# Patient Record
Sex: MALE | Race: WHITE | Employment: OTHER | URBAN - NONMETROPOLITAN AREA
[De-identification: names, ages, dates, MRNs, and addresses within clinical notes are randomized per-mention and may not be internally consistent; named-entity substitution may affect disease eponyms.]

---

## 2017-02-07 ENCOUNTER — OFFICE VISIT (OUTPATIENT)
Dept: PSYCHIATRY | Age: 42
End: 2017-02-07
Payer: COMMERCIAL

## 2017-02-07 VITALS
DIASTOLIC BLOOD PRESSURE: 76 MMHG | BODY MASS INDEX: 26.87 KG/M2 | WEIGHT: 187.7 LBS | HEIGHT: 70 IN | HEART RATE: 64 BPM | SYSTOLIC BLOOD PRESSURE: 117 MMHG | OXYGEN SATURATION: 100 %

## 2017-02-07 DIAGNOSIS — F31.0: Primary | ICD-10-CM

## 2017-02-07 PROCEDURE — 99214 OFFICE O/P EST MOD 30 MIN: CPT | Performed by: NURSE PRACTITIONER

## 2017-02-07 RX ORDER — ESCITALOPRAM OXALATE 10 MG/1
10 TABLET ORAL DAILY
Qty: 30 TABLET | Refills: 2 | Status: SHIPPED | OUTPATIENT
Start: 2017-02-07 | End: 2017-06-08 | Stop reason: SDUPTHER

## 2017-02-07 RX ORDER — CLONAZEPAM 0.5 MG/1
0.5 TABLET ORAL 2 TIMES DAILY PRN
Qty: 60 TABLET | Refills: 0 | Status: SHIPPED | OUTPATIENT
Start: 2017-02-07 | End: 2017-06-08 | Stop reason: SDUPTHER

## 2017-02-07 RX ORDER — RISPERIDONE 0.5 MG/1
0.5 TABLET, FILM COATED ORAL 2 TIMES DAILY
Qty: 60 TABLET | Refills: 2 | Status: SHIPPED | OUTPATIENT
Start: 2017-02-07 | End: 2017-06-08 | Stop reason: SDUPTHER

## 2017-06-08 ENCOUNTER — OFFICE VISIT (OUTPATIENT)
Dept: PSYCHIATRY | Age: 42
End: 2017-06-08
Payer: COMMERCIAL

## 2017-06-08 ENCOUNTER — TELEPHONE (OUTPATIENT)
Dept: PSYCHIATRY | Age: 42
End: 2017-06-08

## 2017-06-08 VITALS
OXYGEN SATURATION: 97 % | HEIGHT: 70 IN | SYSTOLIC BLOOD PRESSURE: 123 MMHG | HEART RATE: 87 BPM | DIASTOLIC BLOOD PRESSURE: 81 MMHG | WEIGHT: 186.6 LBS | BODY MASS INDEX: 26.71 KG/M2

## 2017-06-08 DIAGNOSIS — F31.0: Primary | ICD-10-CM

## 2017-06-08 PROCEDURE — 99213 OFFICE O/P EST LOW 20 MIN: CPT | Performed by: NURSE PRACTITIONER

## 2017-06-08 RX ORDER — RISPERIDONE 0.5 MG/1
0.5 TABLET, FILM COATED ORAL 2 TIMES DAILY
Qty: 60 TABLET | Refills: 2 | Status: SHIPPED | OUTPATIENT
Start: 2017-06-08 | End: 2017-09-12 | Stop reason: SDUPTHER

## 2017-06-08 RX ORDER — CLONAZEPAM 0.5 MG/1
0.5 TABLET ORAL 2 TIMES DAILY PRN
Qty: 60 TABLET | Refills: 0 | Status: SHIPPED | OUTPATIENT
Start: 2017-06-08 | End: 2017-09-12 | Stop reason: ALTCHOICE

## 2017-06-08 RX ORDER — ESCITALOPRAM OXALATE 10 MG/1
10 TABLET ORAL DAILY
Qty: 30 TABLET | Refills: 2 | Status: SHIPPED | OUTPATIENT
Start: 2017-06-08 | End: 2017-09-12 | Stop reason: SDUPTHER

## 2017-09-12 ENCOUNTER — OFFICE VISIT (OUTPATIENT)
Dept: PSYCHIATRY | Age: 42
End: 2017-09-12
Payer: COMMERCIAL

## 2017-09-12 VITALS
SYSTOLIC BLOOD PRESSURE: 123 MMHG | DIASTOLIC BLOOD PRESSURE: 73 MMHG | BODY MASS INDEX: 26.63 KG/M2 | OXYGEN SATURATION: 100 % | HEIGHT: 70 IN | HEART RATE: 66 BPM | WEIGHT: 186 LBS

## 2017-09-12 DIAGNOSIS — F31.0: ICD-10-CM

## 2017-09-12 PROCEDURE — 99213 OFFICE O/P EST LOW 20 MIN: CPT | Performed by: NURSE PRACTITIONER

## 2017-09-12 PROCEDURE — 90832 PSYTX W PT 30 MINUTES: CPT | Performed by: NURSE PRACTITIONER

## 2017-09-12 RX ORDER — HYDROXYZINE PAMOATE 50 MG/1
CAPSULE ORAL
Qty: 60 CAPSULE | Refills: 2 | Status: SHIPPED | OUTPATIENT
Start: 2017-09-12 | End: 2017-12-12

## 2017-09-12 RX ORDER — ESCITALOPRAM OXALATE 10 MG/1
10 TABLET ORAL DAILY
Qty: 30 TABLET | Refills: 2 | Status: SHIPPED | OUTPATIENT
Start: 2017-09-12 | End: 2017-12-12 | Stop reason: DRUGHIGH

## 2017-09-12 RX ORDER — RISPERIDONE 0.5 MG/1
0.5 TABLET, FILM COATED ORAL 2 TIMES DAILY
Qty: 60 TABLET | Refills: 2 | Status: SHIPPED | OUTPATIENT
Start: 2017-09-12 | End: 2017-12-12 | Stop reason: DRUGHIGH

## 2017-12-12 ENCOUNTER — OFFICE VISIT (OUTPATIENT)
Dept: PSYCHIATRY | Age: 42
End: 2017-12-12
Payer: COMMERCIAL

## 2017-12-12 VITALS
OXYGEN SATURATION: 100 % | HEIGHT: 70 IN | HEART RATE: 62 BPM | BODY MASS INDEX: 26.9 KG/M2 | DIASTOLIC BLOOD PRESSURE: 76 MMHG | WEIGHT: 187.9 LBS | SYSTOLIC BLOOD PRESSURE: 123 MMHG

## 2017-12-12 DIAGNOSIS — F31.9 BIPOLAR I DISORDER (HCC): Primary | ICD-10-CM

## 2017-12-12 PROCEDURE — 90832 PSYTX W PT 30 MINUTES: CPT | Performed by: NURSE PRACTITIONER

## 2017-12-12 PROCEDURE — 99999 PR OFFICE/OUTPT VISIT,PROCEDURE ONLY: CPT | Performed by: NURSE PRACTITIONER

## 2017-12-12 RX ORDER — RISPERIDONE 0.5 MG/1
TABLET, FILM COATED ORAL
Qty: 15 TABLET | Refills: 3 | Status: SHIPPED | OUTPATIENT
Start: 2017-12-12 | End: 2017-12-12 | Stop reason: DRUGHIGH

## 2017-12-12 RX ORDER — ESCITALOPRAM OXALATE 5 MG/1
5 TABLET ORAL DAILY
Qty: 30 TABLET | Refills: 3 | Status: SHIPPED | OUTPATIENT
Start: 2017-12-12 | End: 2018-03-13 | Stop reason: SDUPTHER

## 2017-12-12 RX ORDER — RISPERIDONE 0.5 MG/1
TABLET, FILM COATED ORAL
Qty: 30 TABLET | Refills: 1 | Status: SHIPPED | OUTPATIENT
Start: 2017-12-12 | End: 2018-03-13 | Stop reason: SDUPTHER

## 2017-12-12 NOTE — PROGRESS NOTES
12/12/2017 10:02 AM   Progress Note        Benay Mortimer 1975  Psychotherapy Time Spent: 17 min      Psychotherapy Topics: family, medication changes, back pain    Subjective: The patient is a 42 y.o.   male who is here for a routine office visit. Last seen by this writer on  6/8/17. Dx: Bipolar I D/O    Continues to care take mom with dementia. Going well. She is in the lobby. Depression: Denies  Anxiety: Denies  Sleep: Good  Appetite: Good  Substance Use: Denies  Pain: Denies    Six to seven months ago he decreased his Risperdal dose to one qhs instead of one bid because he was feeling blunted. Also cut Lexapro in half at same time for same reason. Feeling better and would like to continue dose changes. He states when he began med management one of his sources of depression and anxiety was his back pain which kept him from doing the things he wanted to do. His back pain is greatly improved and he is able to function better. This is another reason he decreased his doses. Pt has multiple hobbies. He will call if needed for dose adjustments. Patient reports side effects as follows: none. No evidence of EPS, no cogwheeling or abnormal motor movements. Absent  suicidal ideation. Reports compliance with medications as see above. Review of Systems - Denies changes except for decrease in back pain. Current Meds:    Prior to Admission medications    Medication Sig Start Date End Date Taking? Authorizing Provider   risperiDONE (RISPERDAL) 0.5 MG tablet Take 1 tablet by mouth 2 times daily 9/12/17   Jari Pallas, APRN   escitalopram (LEXAPRO) 10 MG tablet Take 1 tablet by mouth daily 9/12/17   Jari Pallas, APRN   hydrOXYzine (VISTARIL) 50 MG capsule Take one to two tablets qhs prn anxiety 9/12/17   Jari Pallas, APRN       MSE:  Patient is  A & O x3. Appearance:  well-appearing appropriately dressed for season and age.   Cognition:  Recent memory intact , remote memory intact , good fund of knowledge, average  intelligence level. Speech:  normal  Language: Naming: intact; Word Finding: intact  Conversation no evidence of delusions, paranoid, persecutory, grandiose, ideas of reference, thought broadcasting, thought insertion and delusions of control  Behavior:  Cooperative and Good eye contact  Mood: happy  Affect: congruent with mood  Thought Content: no evidence of overt psychosis, delusional thought or suicidal /homicidal ideation or plan  Thought Process: linear, goal directed and coherent  Judgement Insight:  normal and appropriate  Gait and Station:normal gait and station   Musculoskeletal: WNL    Assesment: See above   Ceci Simms report reviewed per  req. Plan: Decrease Risperdal to 0.5 mg qhs, decrease Lexapro to 5 mg daily. 1. The risks, benefits, side effects, indications, contraindications, and adverse effects of the medications have been discussed. yes   2. The pt has verbalized understanding and has capacity to give informed consent. 3. The Ceci Simms report has been reviewed according to [facility] regulations. 4. Supportive therapy offered. 5. Follow up: Return in 3 months  6. The patient has been advised to call with any problems.   7. Controlled substance Treatment Plan: No Rx

## 2018-03-13 ENCOUNTER — OFFICE VISIT (OUTPATIENT)
Dept: PSYCHIATRY | Age: 43
End: 2018-03-13
Payer: COMMERCIAL

## 2018-03-13 VITALS
SYSTOLIC BLOOD PRESSURE: 130 MMHG | DIASTOLIC BLOOD PRESSURE: 73 MMHG | OXYGEN SATURATION: 100 % | HEIGHT: 70 IN | HEART RATE: 78 BPM | BODY MASS INDEX: 26.2 KG/M2 | WEIGHT: 183 LBS

## 2018-03-13 DIAGNOSIS — F31.9 BIPOLAR I DISORDER (HCC): Primary | ICD-10-CM

## 2018-03-13 PROCEDURE — 99999 PR OFFICE/OUTPT VISIT,PROCEDURE ONLY: CPT | Performed by: NURSE PRACTITIONER

## 2018-03-13 PROCEDURE — 90832 PSYTX W PT 30 MINUTES: CPT | Performed by: NURSE PRACTITIONER

## 2018-03-13 RX ORDER — ESCITALOPRAM OXALATE 5 MG/1
5 TABLET ORAL DAILY
Qty: 30 TABLET | Refills: 3 | Status: SHIPPED | OUTPATIENT
Start: 2018-03-13 | End: 2018-07-10 | Stop reason: SDUPTHER

## 2018-03-13 RX ORDER — RISPERIDONE 0.5 MG/1
TABLET, FILM COATED ORAL
Qty: 30 TABLET | Refills: 3 | Status: SHIPPED | OUTPATIENT
Start: 2018-03-13 | End: 2018-10-04

## 2018-03-13 NOTE — PROGRESS NOTES
evidence of overt psychosis, delusional thought or suicidal /homicidal ideation or plan  Thought Process: linear, goal directed and coherent  Judgement Insight:  normal and appropriate  Gait and Station:normal gait and station   Musculoskeletal: WNL    Assesment: See above   Lora Wheeler report reviewed per  req. Plan: Continue Lexapro 5 mg, Risperdal 0.5 mg qhs  1. The risks, benefits, side effects, indications, contraindications, and adverse effects of the medications have been discussed. yes   2. The pt has verbalized understanding and has capacity to give informed consent. 3. The Lora Wheeler report has been reviewed according to [facility] regulations. 4. Supportive therapy offered. 5. Follow up: Return in about 4 months (around 7/13/2018). 6. The patient has been advised to call with any problems.   7. Controlled substance Treatment Plan: No Rx

## 2018-07-10 ENCOUNTER — OFFICE VISIT (OUTPATIENT)
Dept: PSYCHIATRY | Age: 43
End: 2018-07-10
Payer: COMMERCIAL

## 2018-07-10 VITALS
HEIGHT: 70 IN | WEIGHT: 189.3 LBS | OXYGEN SATURATION: 97 % | HEART RATE: 86 BPM | BODY MASS INDEX: 27.1 KG/M2 | DIASTOLIC BLOOD PRESSURE: 89 MMHG | SYSTOLIC BLOOD PRESSURE: 136 MMHG

## 2018-07-10 DIAGNOSIS — F31.9 BIPOLAR I DISORDER (HCC): Primary | ICD-10-CM

## 2018-07-10 PROCEDURE — 99213 OFFICE O/P EST LOW 20 MIN: CPT | Performed by: NURSE PRACTITIONER

## 2018-07-10 RX ORDER — ESCITALOPRAM OXALATE 5 MG/1
5 TABLET ORAL DAILY
Qty: 30 TABLET | Refills: 3 | Status: SHIPPED | OUTPATIENT
Start: 2018-07-10 | End: 2018-10-04 | Stop reason: SDUPTHER

## 2018-07-10 NOTE — PROGRESS NOTES
7/10/2018      Progress Note        Nina Vo 1975       HPI:  The patient is a 36 y.o.   male who is here for a routine office visit. Dx: Bipolar I D/O, current of most recent episode hypomanic with anxious distress    Doing well overall. Continues to take care of his mother who has Alzheimer's  which is going well. He states he stopped Risperdal at the end of March. He did not feel he needed it. He denies intrusive thoughts, AH/VH, paranoia, SI/HI. He has a full bottle at home and states he will start it again if he feels he needs it. Encouraged pt to call office with questions or problems and to go the ER to be evaluated if necessary. He agrees. Patient reports side effects as follows: none. No evidence of EPS, no cogwheeling or abnormal motor movements. Absent  suicidal ideation. Reports compliance with medications as see above     Review of Systems - Denies change      Current Meds:    Current Outpatient Prescriptions   Medication Sig Dispense Refill    escitalopram (LEXAPRO) 5 MG tablet Take 1 tablet by mouth daily 30 tablet 3    risperiDONE (RISPERDAL) 0.5 MG tablet Take one tablet qhs 30 tablet 3     No current facility-administered medications for this visit. MSE:  Patient is  A & O x3. Appearance:  well-appearing appropriately dressed for season and age. Cognition:  Recent memory intact , remote memory intact , good fund of knowledge, average  intelligence level. Speech:  normal  Language: Naming: intact; Word Finding: intact  Conversation no evidence of delusions  Behavior:  Cooperative and Good eye contact  Mood: happy  Affect: congruent with mood  Thought Content: no evidence of overt psychosis, delusional thought or suicidal /homicidal ideation or plan  Thought Process: linear, goal directed and coherent  Judgement Insight:   Fair  Gait and Station:normal gait and station   Musculoskeletal: WNL        Plan: Continue Lexapro 5 mg qam  1.  The risks, benefits,

## 2018-10-04 ENCOUNTER — OFFICE VISIT (OUTPATIENT)
Dept: PSYCHIATRY | Age: 43
End: 2018-10-04
Payer: COMMERCIAL

## 2018-10-04 DIAGNOSIS — F41.9 ANXIETY: Primary | ICD-10-CM

## 2018-10-04 PROCEDURE — 99214 OFFICE O/P EST MOD 30 MIN: CPT | Performed by: CLINICAL NURSE SPECIALIST

## 2018-10-04 RX ORDER — ESCITALOPRAM OXALATE 5 MG/1
5 TABLET ORAL DAILY
Qty: 30 TABLET | Refills: 5 | Status: SHIPPED | OUTPATIENT
Start: 2018-10-04 | End: 2019-03-07 | Stop reason: SDUPTHER

## 2018-10-04 NOTE — PROGRESS NOTES
10/4/2018 3:34 PM   Progress Note        Cheryl Godoy 1975  Psychotherapy Time Spent: 25 min      Psychotherapy Topics: family and health    PCP IS:Chuckie Gallegos APRN, SRI        Subjective:  Patient is a 35 yo  male diagnosed with Bipolar disorder and presents today for follow-up. Last seen in clinic on 7/10 and prior records were reviewed. History obtained via chart review and patient    CHIEF COMPLAINT: anxiety    Today patient states, he is doing well. He stopped taking Risperidone several months ago, didn't feel he needed it. No difference. He reports having anxiety for a long time, since a teenager, but is not as troublesome now. He hasnt had a big anxiety attack in a long time. He is sole caregiver for his dementing mother, who has been diagnosed with Alzheimers disease and medical problems. He takes his mother with him when he goes places. He has to help her with mobility, medications,  hygiene, takes care of the house. 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 The responsibilities are all on him. SUBSTANCE USE/ABUSE:   TOBACCO: denied   ALCOHOL: historic. Last time was a couple of weeks ago, 1 drink then. Once a week or less, doesn't feel like it. MARIJUANA: denied   STREET/RECREATIONAL DRUGS: denied    DANGEROUSNESS:   SUICIDE IDEATION: denied   HOMICIDAL IDEATION/DANGEROUSNESS TO OTHERS: denied        Review of Systems - 12 point review negative today        Current Meds:    Prior to Admission medications    Medication Sig Start Date End Date Taking? Authorizing Provider   escitalopram (LEXAPRO) 5 MG tablet Take 1 tablet by mouth daily 7/10/18   JOSE DAVID Eng       Reports compliance with medications as good . Patient reports side effects as follows: none. No evidence of EPS, no cogwheeling or abnormal motor movements. Gait and Station:normal gait and station   Musculoskeletal: WNL    MSE:  Patient is  A & O x3.   Appearance:

## 2019-03-07 ENCOUNTER — OFFICE VISIT (OUTPATIENT)
Dept: PSYCHIATRY | Age: 44
End: 2019-03-07
Payer: COMMERCIAL

## 2019-03-07 VITALS — HEART RATE: 92 BPM | OXYGEN SATURATION: 98 % | DIASTOLIC BLOOD PRESSURE: 86 MMHG | SYSTOLIC BLOOD PRESSURE: 146 MMHG

## 2019-03-07 DIAGNOSIS — F41.9 ANXIETY: Primary | ICD-10-CM

## 2019-03-07 PROCEDURE — 99213 OFFICE O/P EST LOW 20 MIN: CPT | Performed by: NURSE PRACTITIONER

## 2019-03-07 RX ORDER — ESCITALOPRAM OXALATE 5 MG/1
5 TABLET ORAL DAILY
Qty: 30 TABLET | Refills: 5 | Status: SHIPPED | OUTPATIENT
Start: 2019-03-07 | End: 2019-09-03 | Stop reason: SDUPTHER

## 2019-09-03 ENCOUNTER — OFFICE VISIT (OUTPATIENT)
Dept: PSYCHIATRY | Age: 44
End: 2019-09-03
Payer: COMMERCIAL

## 2019-09-03 VITALS
SYSTOLIC BLOOD PRESSURE: 134 MMHG | DIASTOLIC BLOOD PRESSURE: 84 MMHG | OXYGEN SATURATION: 96 % | TEMPERATURE: 97.4 F | BODY MASS INDEX: 30.42 KG/M2 | WEIGHT: 212 LBS

## 2019-09-03 DIAGNOSIS — F41.9 ANXIETY: Primary | ICD-10-CM

## 2019-09-03 PROCEDURE — 99214 OFFICE O/P EST MOD 30 MIN: CPT | Performed by: NURSE PRACTITIONER

## 2019-09-03 RX ORDER — ESCITALOPRAM OXALATE 5 MG/1
5 TABLET ORAL DAILY
Qty: 30 TABLET | Refills: 5 | Status: SHIPPED | OUTPATIENT
Start: 2019-09-03 | End: 2020-05-01 | Stop reason: SDUPTHER

## 2019-09-03 NOTE — PROGRESS NOTES
9/3/2019 1:54 PM   Progress Note    IN:  1330  OUT: [address] [facility] 1975      Chief Complaint   Patient presents with    6 Month Follow-Up    Anxiety         Subjective:  Patient is a 39 y.o. male diagnosed with Anxiety and presents today for follow-up. Last seen in clinic on 3/7/19 and prior records were reviewed. Today patient states, \"I'm doing good. \" He reports that he continues to care for his mother who has Alzheimer's Disease. He reports that he traveled with her this summer to the [facility] and that he took her to the [facility] on the way back. He has plans to visit the ocean with her this fall. He says that it is quite a workout to travel with her. He has a rental house and farms for income. Patient reports side effects as follows: none. No evidence of EPS, no cogwheeling or abnormal motor movements. Absent  suicidal ideation. Reports compliance with medications as good .      Current Substance Use:  See history    BP: /84 (Site: Left Upper Arm, Position: Sitting)   Temp 97.4 °F (36.3 °C)   Wt 212 lb (96.2 kg)   SpO2 96%   BMI 30.42 kg/m²       Review of Systems - 14 point review:  Negative except being treated for: anxiety    Constitutional: (fevers, chills, night sweats, wt loss/gain, change in appetite, fatigue, somnolence)    HEENT: (ear pain or discharge, hearing loss, ear ringing, sinus pressure, nosebleed, nasal discharge, sore throat, oral sores, tooth pain, bleeding gums, hoarse voice, neck pain)      Cardiovascular: (HTN, chest pain, elevated cholesterol/lipids, palpitations, leg swelling, leg pain with walking)    Respiratory: (cough, wheezing, snoring, SOB with activity (dyspnea), SOB while lying flat (orthopnea), awakening with severe SOB (paroxysmal nocturnal dyspnea))    Gastrointestinal: (NVD, constipation, abdominal pain, bright red stools, black tarry stools, stool incontinence)     Genitourinary:  (pelvic pain, burning or frequency of urination,

## 2020-05-01 ENCOUNTER — VIRTUAL VISIT (OUTPATIENT)
Dept: PSYCHIATRY | Age: 45
End: 2020-05-01
Payer: COMMERCIAL

## 2020-05-01 PROCEDURE — 99443 PR PHYS/QHP TELEPHONE EVALUATION 21-30 MIN: CPT | Performed by: NURSE PRACTITIONER

## 2020-05-01 RX ORDER — ESCITALOPRAM OXALATE 5 MG/1
5 TABLET ORAL DAILY
Qty: 30 TABLET | Refills: 5 | Status: SHIPPED | OUTPATIENT
Start: 2020-05-01 | End: 2021-01-25 | Stop reason: ALTCHOICE

## 2020-05-01 SDOH — HEALTH STABILITY: MENTAL HEALTH: HOW MANY STANDARD DRINKS CONTAINING ALCOHOL DO YOU HAVE ON A TYPICAL DAY?: 1 OR 2

## 2021-01-25 ENCOUNTER — VIRTUAL VISIT (OUTPATIENT)
Dept: PSYCHIATRY | Age: 46
End: 2021-01-25
Payer: COMMERCIAL

## 2021-01-25 DIAGNOSIS — F33.42 RECURRENT MAJOR DEPRESSIVE DISORDER, IN FULL REMISSION (HCC): Primary | ICD-10-CM

## 2021-01-25 PROCEDURE — 99441 PR PHYS/QHP TELEPHONE EVALUATION 5-10 MIN: CPT | Performed by: NURSE PRACTITIONER

## 2021-01-25 SDOH — HEALTH STABILITY: MENTAL HEALTH: HOW OFTEN DO YOU HAVE A DRINK CONTAINING ALCOHOL?: 4 OR MORE TIMES A WEEK

## 2021-01-25 ASSESSMENT — PATIENT HEALTH QUESTIONNAIRE - PHQ9
SUM OF ALL RESPONSES TO PHQ QUESTIONS 1-9: 0
6. FEELING BAD ABOUT YOURSELF - OR THAT YOU ARE A FAILURE OR HAVE LET YOURSELF OR YOUR FAMILY DOWN: 0
7. TROUBLE CONCENTRATING ON THINGS, SUCH AS READING THE NEWSPAPER OR WATCHING TELEVISION: 0
SUM OF ALL RESPONSES TO PHQ9 QUESTIONS 1 & 2: 0
9. THOUGHTS THAT YOU WOULD BE BETTER OFF DEAD, OR OF HURTING YOURSELF: 0
2. FEELING DOWN, DEPRESSED OR HOPELESS: 0
4. FEELING TIRED OR HAVING LITTLE ENERGY: 0
8. MOVING OR SPEAKING SO SLOWLY THAT OTHER PEOPLE COULD HAVE NOTICED. OR THE OPPOSITE, BEING SO FIGETY OR RESTLESS THAT YOU HAVE BEEN MOVING AROUND A LOT MORE THAN USUAL: 0
SUM OF ALL RESPONSES TO PHQ QUESTIONS 1-9: 0
1. LITTLE INTEREST OR PLEASURE IN DOING THINGS: 0

## 2021-01-25 ASSESSMENT — ANXIETY QUESTIONNAIRES
7. FEELING AFRAID AS IF SOMETHING AWFUL MIGHT HAPPEN: 0-NOT AT ALL
6. BECOMING EASILY ANNOYED OR IRRITABLE: 0-NOT AT ALL
3. WORRYING TOO MUCH ABOUT DIFFERENT THINGS: 0-NOT AT ALL
4. TROUBLE RELAXING: 0-NOT AT ALL
GAD7 TOTAL SCORE: 0

## 2021-01-25 NOTE — PROGRESS NOTES
Mary Pierce is a 54 y.o. male evaluated via telephone on 1/25/2021. Consent:  He and/or health care decision maker is aware that that he may receive a bill for this telephone service, depending on his insurance coverage, and has provided verbal consent to proceed: Yes      Documentation:  I communicated with the patient and/or health care decision maker about depression, anxiety. Details of this discussion including any medical advice provided: see below      I affirm this is a Patient Initiated Episode with a Patient who has not had a related appointment within my department in the past 7 days or scheduled within the next 24 hours. Patient identification was verified at the start of the visit: Yes    Total Time: minutes: 5-10 minutes    Note: not billable if this call serves to triage the patient into an appointment for the relevant concern      Richard Curiel     1/25/2021 11:50 AM   Progress Note    IN:  [address]  OUT: [address] 1975      Chief Complaint   Patient presents with    Follow-up    Anxiety    Depression         Subjective:  Patient is a 54 y.o. male diagnosed with Anxiety and presents today for follow-up. Last seen in clinic on 5/1/20 and prior records were reviewed. Today patient states, \"I'm doing good. \" He reports no complaints or concerns. He reports that he quit taking Lexapro back in July. Patient reports side effects as follows: none. No evidence of EPS, no cogwheeling or abnormal motor movements. Absent  suicidal ideation. Reports compliance with medications as good . Current Substance Use:  See history    BP: There were no vitals taken for this visit.       Review of Systems - 14 point review:  Negative except being treated for: is taking Metformin for pre-diabetes    Constitutional: (fevers, chills, night sweats, wt loss/gain, change in appetite, fatigue, somnolence) HEENT: (ear pain or discharge, hearing loss, ear ringing, sinus pressure, nosebleed, nasal discharge, sore throat, oral sores, tooth pain, bleeding gums, hoarse voice, neck pain)      Cardiovascular: (HTN, chest pain, elevated cholesterol/lipids, palpitations, leg swelling, leg pain with walking)    Respiratory: (cough, wheezing, snoring, SOB with activity (dyspnea), SOB while lying flat (orthopnea), awakening with severe SOB (paroxysmal nocturnal dyspnea))    Gastrointestinal: (NVD, constipation, abdominal pain, bright red stools, black tarry stools, stool incontinence)     Genitourinary:  (pelvic pain, burning or frequency of urination, urinary urgency, blood in urine incomplete bladder emptying, urinary incontinence, STD; MEN: testicular pain or swelling, erectile dysfunction; WOMEN: LMP, heavy menstrual bleeding (menorrhagia), irregular periods, postmenopausal bleeding, menstrual pain (dymenorrhea, vaginal discharge)    Musculoskeletal: (bone pain/fracture, joint pain or swelling, musle pain)    Integumentary: (rashes, acne, non-healing sores, itching, breast lumps, breast pain, nipple discharge, hair loss)    Neurologic: (HA, muscle weakness, paresthesias (numbness, coldness, crawling or prickling), memory loss, seizure, dizziness)    Psychiatric:  (anxiety, sadness, irritability/anger, insomnia, suicidality)    Endocrine: (heat or cold intolerance, excessive thirst (polydipsia), excessive hunger (polyphagia))    Immune/Allergic: (hives, seasonal or environmental allergies, HIV exposure)    Hematologic/Lymphatic: (lymph node enlargement, easy bleeding or bruising)    History obtained via chart review and patient    PCP is JOSE DAVID Smith CNP       Current Meds:    Prior to Admission medications    Medication Sig Start Date End Date Taking?  Authorizing Provider   metFORMIN (GLUCOPHAGE) 500 MG tablet Take 500 mg by mouth daily    Historical Provider, MD Multiple Vitamins-Minerals (MULTIVITAMIN ADULT PO) Take by mouth    Historical Provider, MD     Social History     Socioeconomic History    Marital status: Single     Spouse name: None    Number of children: 0    Years of education: 15    Highest education level: None   Occupational History    None   Social Needs    Financial resource strain: None    Food insecurity     Worry: None     Inability: None    Transportation needs     Medical: None     Non-medical: None   Tobacco Use    Smoking status: Never Smoker    Smokeless tobacco: Never Used   Substance and Sexual Activity    Alcohol use: Yes     Alcohol/week: 2.0 standard drinks     Types: 2 Shots of liquor per week     Frequency: 4 or more times a week     Drinks per session: 1 or 2     Comment: occasional, 2 shots, 4-5 times per week    Drug use: No    Sexual activity: Never     Comment: no current girlfriend   Lifestyle    Physical activity     Days per week: None     Minutes per session: None    Stress: None   Relationships    Social connections     Talks on phone: None     Gets together: None     Attends [employer] service: None     Active member of club or organization: None     Attends meetings of clubs or organizations: None     Relationship status: None    Intimate partner violence     Fear of current or ex partner: None     Emotionally abused: None     Physically abused: None     Forced sexual activity: None   Other Topics Concern    None   Social History Narrative    None       MSE:  Patient is  A & O x 4. Appearance:  FRENCH  Cognition:  Recent memory intact , remote memory intact , good fund of knowledge, average  intelligence level. Speech:  normal  Language: Naming: intact;  Word Finding: intact  Conversation no evidence of delusions  Behavior:  Cooperative  Mood: \"good\"   Affect: congruent with mood  Thought Content: negative delusions, negative hallucinations, negative obsessions,  negativehomicidal and negative suicidal Thought Process: linear, goal directed and coherent  Associations: logical connections  Attention Span and concentration: Normal   Judgement Insight:  normal and appropriate  Gait and Station: FRENCH  Sleep: avg 6-8 hrs   Appetite: ok    Cognition: Can spell \"world\" backwards: Yes                    Can do serial 7's: Yes    Lab Results   Component Value Date     01/27/2016    K 3.9 01/27/2016    CL 94 (L) 01/27/2016    CO2 30 (H) 01/27/2016    BUN 10 01/27/2016    CREATININE 0.6 01/27/2016    GLUCOSE 112 (H) 01/27/2016    CALCIUM 9.6 01/27/2016    PROT 7.6 01/27/2016    LABALBU 4.6 01/27/2016    BILITOT 0.8 01/27/2016    ALKPHOS 71 01/27/2016    AST 19 01/27/2016    ALT 31 01/27/2016    LABGLOM >60 01/27/2016    GLOB 3.0 01/27/2016     Lab Results   Component Value Date     01/27/2016    K 3.9 01/27/2016    CL 94 01/27/2016    CO2 30 01/27/2016    BUN 10 01/27/2016    CREATININE 0.6 01/27/2016    GLUCOSE 112 01/27/2016    CALCIUM 9.6 01/27/2016      Lab Results   Component Value Date    CHOL 224 (H) 02/29/2016     Lab Results   Component Value Date    TRIG 218 (H) 02/29/2016     Lab Results   Component Value Date    HDL 57 02/29/2016     Lab Results   Component Value Date    LDLCALC 123 02/29/2016     No results found for: LABVLDL, VLDL  No results found for: CHOLHDLRATIO  No results found for: LABA1C  No results found for: EAG  No results found for: TSHFT4, TSH  No results found for: VITD25    Assessments Administered:    PHQ9:  0, normal  GAD7:   0, normal    Assessment:   1. Recurrent major depressive disorder, in full remission ([facility] [address]        Plan:  Discontinue: Lexapro    Continue seeing Dajuan Reina every two month for therapy    Follow up: No follow-ups on file. 1. The risks, benefits, side effects, indications, contraindications, and adverse effects of the medications have been discussed. Yes.  2. The pt has verbalized understanding and has capacity to give informed consent. 3. The Nuris Shaikh report has been reviewed according to [facility] regulations. 4. Supportive therapy offered. 5. Follow up: No follow-ups on file. 6. The patient has been advised to call with any problems. 7. Controlled substance Treatment Plan: none. 8. The above listed medications have been continued, modifications in meds and other orders/labs as follows: No orders of the defined types were placed in this encounter. No orders of the defined types were placed in this encounter. 9. Additional comments: He reports that he stopped taking Lexapro in July, 2020 and that he is doing fine. This is reflected in his PHQ9 and GAD7 scores. He continues to see Kirill Morrow for therapy every 2 months. Will not schedule any follow up visits at this time. He was invited to call back should anything change about his mood. He verbalized understanding.        10.Over 50% of the total visit time of  10  minutes was spent on counseling and/or coordination of care of:                        1. Recurrent major depressive disorder, in full remission [facility])                      Psychotherapy Topics: mood/medication effectiveness/ family      Lukas Schultz PMHNP-BC

## 2022-01-16 ENCOUNTER — HOSPITAL ENCOUNTER (EMERGENCY)
Age: 47
Discharge: HOME | End: 2022-01-16
Payer: COMMERCIAL

## 2022-01-16 ENCOUNTER — APPOINTMENT (OUTPATIENT)
Dept: GENERAL RADIOLOGY | Age: 47
End: 2022-01-16
Payer: COMMERCIAL

## 2022-01-16 VITALS
DIASTOLIC BLOOD PRESSURE: 97 MMHG | OXYGEN SATURATION: 99 % | SYSTOLIC BLOOD PRESSURE: 153 MMHG | RESPIRATION RATE: 18 BRPM | HEART RATE: 78 BPM | TEMPERATURE: 98.1 F

## 2022-01-16 DIAGNOSIS — S61.219A LACERATION OF MULTIPLE SITES OF HAND AND FINGERS WITHOUT COMPLICATION, INITIAL ENCOUNTER: ICD-10-CM

## 2022-01-16 DIAGNOSIS — S61.419A LACERATION OF MULTIPLE SITES OF HAND AND FINGERS WITHOUT COMPLICATION, INITIAL ENCOUNTER: ICD-10-CM

## 2022-01-16 DIAGNOSIS — R07.81 RIB PAIN ON RIGHT SIDE: Primary | ICD-10-CM

## 2022-01-16 PROCEDURE — 90471 IMMUNIZATION ADMIN: CPT | Performed by: NURSE PRACTITIONER

## 2022-01-16 PROCEDURE — 71046 X-RAY EXAM CHEST 2 VIEWS: CPT

## 2022-01-16 PROCEDURE — 90715 TDAP VACCINE 7 YRS/> IM: CPT | Performed by: NURSE PRACTITIONER

## 2022-01-16 PROCEDURE — 6360000002 HC RX W HCPCS: Performed by: NURSE PRACTITIONER

## 2022-01-16 PROCEDURE — 99283 EMERGENCY DEPT VISIT LOW MDM: CPT

## 2022-01-16 RX ADMIN — TETANUS TOXOID, REDUCED DIPHTHERIA TOXOID AND ACELLULAR PERTUSSIS VACCINE, ADSORBED 0.5 ML: 5; 2.5; 8; 8; 2.5 SUSPENSION INTRAMUSCULAR at 11:06

## 2022-01-16 ASSESSMENT — ENCOUNTER SYMPTOMS
BACK PAIN: 0
SHORTNESS OF BREATH: 0
COUGH: 0
DIARRHEA: 0
VOMITING: 0
NAUSEA: 0

## 2022-01-16 ASSESSMENT — PAIN SCALES - GENERAL: PAINLEVEL_OUTOF10: 3

## 2022-01-16 NOTE — ED PROVIDER NOTES
5/1/16    Cyst removed         CURRENT MEDICATIONS       Previous Medications    METFORMIN (GLUCOPHAGE) 500 MG TABLET    Take 500 mg by mouth daily    MULTIPLE VITAMINS-MINERALS (MULTIVITAMIN ADULT PO)    Take by mouth       ALLERGIES     Patient has no known allergies. FAMILY HISTORY       Family History   Problem Relation Age of Onset    Cancer Father           SOCIAL HISTORY       Social History     Socioeconomic History    Marital status: Single     Spouse name: Not on file    Number of children: 0    Years of education: 15    Highest education level: Not on file   Occupational History    Not on file   Tobacco Use    Smoking status: Never Smoker    Smokeless tobacco: Never Used   Vaping Use    Vaping Use: Never used   Substance and Sexual Activity    Alcohol use: Yes     Alcohol/week: 2.0 standard drinks     Types: 2 Shots of liquor per week     Comment: occasional, 2 shots, 4-5 times per week    Drug use: No    Sexual activity: Never     Comment: no current girlfriend   Other Topics Concern    Not on file   Social History Narrative    Not on file     Social Determinants of Health     Financial Resource Strain:     Difficulty of Paying Living Expenses: Not on file   Food Insecurity:     Worried About Running Out of Food in the Last Year: Not on file    Argelia of Food in the Last Year: Not on file   Transportation Needs:     Lack of Transportation (Medical): Not on file    Lack of Transportation (Non-Medical):  Not on file   Physical Activity:     Days of Exercise per Week: Not on file    Minutes of Exercise per Session: Not on file   Stress:     Feeling of Stress : Not on file   Social Connections:     Frequency of Communication with Friends and Family: Not on file    Frequency of Social Gatherings with Friends and Family: Not on file    Attends Jehovah's witness Services: Not on file    Active Member of Clubs or Organizations: Not on file    Attends Club or Organization Meetings: Not on file    Marital Status: Not on file   Intimate Partner Violence:     Fear of Current or Ex-Partner: Not on file    Emotionally Abused: Not on file    Physically Abused: Not on file    Sexually Abused: Not on file   Housing Stability:     Unable to Pay for Housing in the Last Year: Not on file    Number of [place] in the Last Year: Not on file    Unstable Housing in the Last Year: Not on file       SCREENINGS                               CIWA Assessment  BP: (!) 153/97  Pulse: 78                 PHYSICAL EXAM    (up to 7 for level 4, 8 or more for level 5)     ED Triage Vitals   BP Temp Temp Source Pulse Resp SpO2 Height Weight   01/16/22 1026 01/16/22 1025 01/16/22 1025 01/16/22 1025 01/16/22 1025 01/16/22 1025 -- --   (!) 153/97 98.1 °F (36.7 °C) Oral 78 18 99 %         Physical Exam  Vitals reviewed. Constitutional:       General: He is not in acute distress. Appearance: Normal appearance. He is not ill-appearing, toxic-appearing or diaphoretic. HENT:      Head: Normocephalic and atraumatic. Right Ear: Tympanic membrane, ear canal and external ear normal.      Left Ear: Tympanic membrane, ear canal and external ear normal.      Nose: Nose normal.      Mouth/Throat:      Mouth: Mucous membranes are moist.      Pharynx: Oropharynx is clear. Eyes:      Extraocular Movements: Extraocular movements intact. Conjunctiva/sclera: Conjunctivae normal.      Pupils: Pupils are equal, round, and reactive to light. Cardiovascular:      Rate and Rhythm: Normal rate and regular rhythm. Pulses: Normal pulses. Heart sounds: Normal heart sounds. Pulmonary:      Effort: Pulmonary effort is normal.      Breath sounds: Normal breath sounds. Comments: Chest tenderness over anterior lateral ribs 5/6  Chest:      Chest wall: Tenderness present. Abdominal:      General: Bowel sounds are normal. There is no distension. Palpations: Abdomen is soft. Tenderness:  There is no abdominal tenderness. There is no right CVA tenderness, left CVA tenderness or guarding. Musculoskeletal:         General: No tenderness. Normal range of motion. Hands:       Cervical back: Normal range of motion and neck supple. No tenderness. Right lower leg: No edema. Left lower leg: No edema. Comments: Lacerations to 2nd/3rd/4th fingers are clean, approximated. No erythema, warmth or edema preset. No bony tenderness. Full ROM. Distal NVI. Hemostatic to 2nd and 3rd fingers. There is occasional ooze from 4th finger base. Skin:     General: Skin is warm and dry. Capillary Refill: Capillary refill takes less than 2 seconds. Neurological:      General: No focal deficit present. Mental Status: He is alert and oriented to person, place, and time. Cranial Nerves: No cranial nerve deficit. Sensory: No sensory deficit. Motor: No weakness. Coordination: Coordination normal.      Gait: Gait normal.      Deep Tendon Reflexes: Reflexes normal.         DIAGNOSTIC RESULTS     EKG: All EKG's are interpreted by the Emergency Department Physician who either signs or Co-signs this chart in the absence of a cardiologist.        RADIOLOGY:   Non-plain film images such as CT, Ultrasound and MRI are read by the radiologist. Plain radiographic images are visualized and preliminarily interpreted by the emergency physician with the below findings:      Interpretation per the Radiologist below, if available at the time of this note:    XR CHEST (2 VW)   Final Result   No acute cardiopulmonary abnormality, no rib fractures are   visualized. Signed by Dr Joaquim Solorzano            ED BEDSIDE ULTRASOUND:   Performed by ED Physician - none    LABS:  Labs Reviewed - No data to display    All other labs were within normal range or not returned as of this dictation.     EMERGENCY DEPARTMENT COURSE and DIFFERENTIAL DIAGNOSIS/MDM:   Vitals:    Vitals:    01/16/22 1025 01/16/22 1026 BP:  (!) 153/97   Pulse: 78    Resp: 18    Temp: 98.1 °F (36.7 °C)    TempSrc: Oral    SpO2: 99%            MDM      REASSESSMENT      Wounds cleansed and dressed. Finger splint made for comfort of 4th finger while healing. Counseled to treatment plan follow up and return parameters. CRITICAL CARE TIME     CONSULTS:  None    PROCEDURES:  Unless otherwise noted below, none     Procedures         FINAL IMPRESSION      1. Rib pain on right side    2. Laceration of multiple sites of hand and fingers without complication, initial encounter          DISPOSITION/PLAN   DISPOSITION Decision To Discharge 01/16/2022 11:33:45 AM      PATIENT REFERRED TO:  JOSE DAVID Garcia CNP  [address] [facility] [address]  [phone]    Call in 1 day        DISCHARGE MEDICATIONS:  New Prescriptions    No medications on file     Controlled Substances Monitoring:     RX Monitoring 2/7/2017   Attestation The Prescription Monitoring Report for this patient was reviewed today.    Periodic Controlled Substance Monitoring Possible medication side effects, risk of tolerance and/or dependence, and alternative treatments discussed       (Please note that portions of this note were completed with a voice recognition program.  Efforts were made to edit the dictations but occasionally words are mis-transcribed.)    JOES DAVID Milan CNP (electronically signed)  Attending Emergency Physician             JOSE DAVID Milan CNP  01/16/22 1946

## 2023-03-21 NOTE — PATIENT INSTRUCTIONS
Impression: Epiphora due to insufficient drainage, left eye: K39.142.  Plan: punctum is swollen and appears to have a mass within, will schedule oculoplastics consult Plan:  Discontinue: Lexapro    Continue seeing Josef Valenzuela every two month for therapy    Follow up: No follow-ups on file.

## 2023-09-12 ENCOUNTER — OFFICE VISIT (OUTPATIENT)
Dept: SURGERY | Age: 48
End: 2023-09-12
Payer: COMMERCIAL

## 2023-09-12 VITALS
DIASTOLIC BLOOD PRESSURE: 74 MMHG | WEIGHT: 183.6 LBS | HEIGHT: 70 IN | BODY MASS INDEX: 26.28 KG/M2 | SYSTOLIC BLOOD PRESSURE: 132 MMHG

## 2023-09-12 DIAGNOSIS — R22.9 SUBCUTANEOUS MASS: Primary | ICD-10-CM

## 2023-09-12 PROCEDURE — 99202 OFFICE O/P NEW SF 15 MIN: CPT | Performed by: PHYSICIAN ASSISTANT

## 2023-09-12 RX ORDER — VIT C/B6/B5/MAGNESIUM/HERB 173 50-5-6-5MG
CAPSULE ORAL DAILY
COMMUNITY

## 2023-09-12 NOTE — PROGRESS NOTES
Subjective  Otoniel Galindo comes with a mass to the left upper extremity. Is been present for quite some time. Is been nontender. He has had no erythema. He has had no drainage. Is no appreciable pain. Objective  There are no problems to display for this patient. Current Outpatient Medications   Medication Sig Dispense Refill    turmeric 500 MG CAPS Take by mouth daily      GARLIC PO Take by mouth      CINNAMON PO Take by mouth      Boswellia-Glucosamine-Vit D (OSTEO BI-FLEX ONE PER DAY PO) Take by mouth      Misc Natural Products (GLUCOSAMINE CHONDROITIN ADV PO) Take by mouth      metFORMIN (GLUCOPHAGE) 500 MG tablet Take 1 tablet by mouth daily      Multiple Vitamins-Minerals (MULTIVITAMIN ADULT PO) Take by mouth       No current facility-administered medications for this visit. Allergies Patient has no known allergies. Past Medical History:   Diagnosis Date    Anxiety     Back pain     mild    Diabetes mellitus ([address])     Psychiatric problem        Past Surgical History:   Procedure Laterality Date    ESOPHAGOGASTRODUODENOSCOPY      HERNIA REPAIR Left     age 3    KNEE SURGERY Left     scope    NECK SURGERY Left 05/01/2016    Cyst removed       Family History   Problem Relation Age of Onset    Other Mother         alzeihmers    Cancer Father         lung       Social History     Tobacco Use    Smoking status: Never    Smokeless tobacco: Never   Substance Use Topics    Alcohol use: Yes     Alcohol/week: 2.0 standard drinks of alcohol     Types: 2 Shots of liquor per week     Comment: occasional, 2 shots, 4-5 times per week        Review of systems  Reviewed and positive for the above all other systems noted to be negative    Exam  Vitals:    09/12/23 1115   BP: 132/74      Ulnar right upper extremity near the anterior forearm there is a mass that measures approximately 5 mm in size. It is movable. There is no associated erythema.       Assessment  Subcutaneous mass right upper

## 2023-09-28 ENCOUNTER — PROCEDURE VISIT (OUTPATIENT)
Dept: SURGERY | Age: 48
End: 2023-09-28
Payer: COMMERCIAL

## 2023-09-28 VITALS
HEIGHT: 70 IN | BODY MASS INDEX: 26.2 KG/M2 | WEIGHT: 183 LBS | DIASTOLIC BLOOD PRESSURE: 80 MMHG | SYSTOLIC BLOOD PRESSURE: 148 MMHG

## 2023-09-28 DIAGNOSIS — R22.32 MASS OF ARM, LEFT: Primary | ICD-10-CM

## 2023-09-28 DIAGNOSIS — R22.9 SUBCUTANEOUS MASS: Primary | ICD-10-CM

## 2023-09-28 PROCEDURE — 11402 EXC TR-EXT B9+MARG 1.1-2 CM: CPT | Performed by: PHYSICIAN ASSISTANT

## 2023-10-10 ENCOUNTER — TELEPHONE (OUTPATIENT)
Dept: SURGERY | Age: 48
End: 2023-10-10

## 2023-10-12 ENCOUNTER — OFFICE VISIT (OUTPATIENT)
Dept: SURGERY | Age: 48
End: 2023-10-12
Payer: COMMERCIAL

## 2023-10-12 VITALS
BODY MASS INDEX: 26.2 KG/M2 | SYSTOLIC BLOOD PRESSURE: 128 MMHG | HEIGHT: 70 IN | DIASTOLIC BLOOD PRESSURE: 74 MMHG | WEIGHT: 183 LBS

## 2023-10-12 DIAGNOSIS — R22.9 SUBCUTANEOUS MASS: Primary | ICD-10-CM

## 2023-10-12 PROCEDURE — 99212 OFFICE O/P EST SF 10 MIN: CPT | Performed by: PHYSICIAN ASSISTANT

## 2023-10-17 NOTE — PROGRESS NOTES
Subjective  Jesi Conde comes office today in follow-up from his left bicep mass biopsy. He states he has been doing well. He has not had any appreciable pain. Objective  Current Outpatient Medications   Medication Sig Dispense Refill    turmeric 500 MG CAPS Take by mouth daily      GARLIC PO Take by mouth      CINNAMON PO Take by mouth      Boswellia-Glucosamine-Vit D (OSTEO BI-FLEX ONE PER DAY PO) Take by mouth      Misc Natural Products (GLUCOSAMINE CHONDROITIN ADV PO) Take by mouth      metFORMIN (GLUCOPHAGE) 500 MG tablet Take 1 tablet by mouth daily      Multiple Vitamins-Minerals (MULTIVITAMIN ADULT PO) Take by mouth       No current facility-administered medications for this visit. Allergies Patient has no known allergies. Past Medical History:   Diagnosis Date    Anxiety     Back pain     mild    Diabetes mellitus ([address])     Psychiatric problem        Past Surgical History:   Procedure Laterality Date    ESOPHAGOGASTRODUODENOSCOPY      HERNIA REPAIR Left     age 3    KNEE SURGERY Left     scope    NECK SURGERY Left 05/01/2016    Cyst removed       Family History   Problem Relation Age of Onset    Other Mother         alzeihmers    Cancer Father         lung       Social History     Tobacco Use    Smoking status: Never    Smokeless tobacco: Never   Substance Use Topics    Alcohol use: Yes     Alcohol/week: 2.0 standard drinks of alcohol     Types: 2 Shots of liquor per week     Comment: occasional, 2 shots, 4-5 times per week      Pathology  Subcutaneous tissue, excision of left bicep mass: Consistent with   pilomatricoma with metaplastic bone formation. Review of systems  Reviewed and positive for the above all other systems noted to be negative    Exam  Vitals:    10/12/23 0918   BP: 128/74      On examination, the incision is healing well. There is no evidence of exudate or infection. The sutures were removed without difficulty.       Assessment  Doing well status post mass excision

## 2023-10-30 NOTE — PROGRESS NOTES
Subjective  Miguel Angel Tariq comes today for mass excision to the left upper extremity. We have discussed the risk benefits and options with him he wishes to proceed. Objective  There are no problems to display for this patient. Current Outpatient Medications   Medication Sig Dispense Refill    turmeric 500 MG CAPS Take by mouth daily      GARLIC PO Take by mouth      CINNAMON PO Take by mouth      Boswellia-Glucosamine-Vit D (OSTEO BI-FLEX ONE PER DAY PO) Take by mouth      Misc Natural Products (GLUCOSAMINE CHONDROITIN ADV PO) Take by mouth      metFORMIN (GLUCOPHAGE) 500 MG tablet Take 1 tablet by mouth daily      Multiple Vitamins-Minerals (MULTIVITAMIN ADULT PO) Take by mouth       No current facility-administered medications for this visit. Allergies Patient has no known allergies. Past Medical History:   Diagnosis Date    Anxiety     Back pain     mild    Diabetes mellitus ([address])     Psychiatric problem        Past Surgical History:   Procedure Laterality Date    ESOPHAGOGASTRODUODENOSCOPY      HERNIA REPAIR Left     age 3    KNEE SURGERY Left     scope    NECK SURGERY Left 05/01/2016    Cyst removed       Family History   Problem Relation Age of Onset    Other Mother         alzeihmers    Cancer Father         lung       Social History     Tobacco Use    Smoking status: Never    Smokeless tobacco: Never   Substance Use Topics    Alcohol use: Yes     Alcohol/week: 2.0 standard drinks of alcohol     Types: 2 Shots of liquor per week     Comment: occasional, 2 shots, 4-5 times per week        Review of systems  Reviewed and positive for the above all other systems noted to be negative    Exam  Vitals:    09/28/23 1543   BP: (!) 148/80        On examination the left upper extremity has a mass about 2 cm in size it is somewhat hard it is movable. It does not appear to be attached to the underlying muscle.     Assessment  Left lower extremity mass    Plan/procedure  We discussed the risk, benefits

## 2024-04-15 ENCOUNTER — OFFICE VISIT (OUTPATIENT)
Dept: SURGERY | Age: 49
End: 2024-04-15
Payer: COMMERCIAL

## 2024-04-15 VITALS
DIASTOLIC BLOOD PRESSURE: 74 MMHG | SYSTOLIC BLOOD PRESSURE: 132 MMHG | BODY MASS INDEX: 25.34 KG/M2 | HEIGHT: 70 IN | WEIGHT: 177 LBS

## 2024-04-15 DIAGNOSIS — R22.9 SUBCUTANEOUS MASS: Primary | ICD-10-CM

## 2024-04-15 PROCEDURE — 99213 OFFICE O/P EST LOW 20 MIN: CPT | Performed by: PHYSICIAN ASSISTANT

## 2024-04-15 NOTE — PROGRESS NOTES
Subjective  Giles Sierra comes office today in follow-up from his left bicep mass biopsy.    He has not had any appreciable recurrence.  Objective  Current Outpatient Medications   Medication Sig Dispense Refill    turmeric 500 MG CAPS Take by mouth daily      GARLIC PO Take by mouth      CINNAMON PO Take by mouth      Boswellia-Glucosamine-Vit D (OSTEO BI-FLEX ONE PER DAY PO) Take by mouth      Misc Natural Products (GLUCOSAMINE CHONDROITIN ADV PO) Take by mouth      metFORMIN (GLUCOPHAGE) 500 MG tablet Take 1 tablet by mouth daily      Multiple Vitamins-Minerals (MULTIVITAMIN ADULT PO) Take by mouth       No current facility-administered medications for this visit.       Allergies Patient has no known allergies.    Past Medical History:   Diagnosis Date    Anxiety     Back pain     mild    Diabetes mellitus ([facility])     Psychiatric problem        Past Surgical History:   Procedure Laterality Date    ESOPHAGOGASTRODUODENOSCOPY      HERNIA REPAIR Left     age 2    KNEE SURGERY Left     scope    NECK SURGERY Left 05/01/2016    Cyst removed       Family History   Problem Relation Age of Onset    Other Mother         alzeihmers    Cancer Father         lung       Social History     Tobacco Use    Smoking status: Never    Smokeless tobacco: Never   Substance Use Topics    Alcohol use: Yes     Alcohol/week: 2.0 standard drinks of alcohol     Types: 2 Shots of liquor per week     Comment: occasional, 2 shots, 4-5 times per week      Pathology  Subcutaneous tissue, excision of left bicep mass: Consistent with   pilomatricoma with metaplastic bone formation.     Review of systems  Reviewed and positive for the above all other systems noted to be negative    Exam  Vitals:    04/15/24 0951   BP: 132/74      On examination, the incision is healed  well.  There is no palpable masses.    Assessment  left upper extremity mass no recurrence    Plan  I will see him back on a prn basis.      (Please note that portions of this note